# Patient Record
Sex: MALE | Race: WHITE | NOT HISPANIC OR LATINO | Employment: OTHER | URBAN - METROPOLITAN AREA
[De-identification: names, ages, dates, MRNs, and addresses within clinical notes are randomized per-mention and may not be internally consistent; named-entity substitution may affect disease eponyms.]

---
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
## 2018-01-15 NOTE — PROCEDURES
Procedures by Carline Yoder DO  at 6/12/2016 10:07 AM      Author:  Carline Yoder DO Service:  Neurology Author Type:  Physician     Filed:  6/12/2016 10:08 AM Date of Service:  6/12/2016 10:07 AM Status:  Signed     :  Carline Yoder DO (Physician)         Procedures:       1  LUMBAR PUNCTURE [id number] (Custom)]       2  LUMBAR PUNCTURE [id number] (Custom)]       3  LUMBAR PUNCTURE [id number] (Custom)]                   Procedure completed the morning of 6/11/2016    Informed consent obtained  Area sterilized with Iodine sol  1% lidocaine sol administered for local anesthetic  L3-4 interspace identified and entered with 22G 3 5 spinal needle  10-12cc Clear CSF collected  No complications  Patient instructed to lay flat for >1hr               Received for:Provider  EPIC   Jun 12 2016 10:07AM [facility] Standard Time

## 2018-01-15 NOTE — PROCEDURES
Procedures by Thomas Ruby MD at 6/6/2016   9:43 AM      Author:  Thomas Ruby MD Service:  Orthopedics Author Type:  Resident    Filed:  6/6/2016  9:45 AM Date of Service:  6/6/2016  9:43 AM Status:  Attested    :  Thomas Ruby MD (Resident)  Cosigner:  Raphael Monroy MD at 6/6/2016 10:24 AM      Procedures:       1  SPLINT APPLICATION [PHT34 (Custom)]              Attestation signed by Raphael Monroy MD at 6/6/2016 10:24 AM           I agree with the note above  Procedure- Orthopedics   Sabra Mercado 62 y o  male MRN: [id number]  Unit/Bed#: [facility] 923-01    Procedure: right short leg cast    Pt was placed in a well padded short leg cast with 4 inch stockinette, webril, and fiberglass which spanned from the metatarsal heads to just below the tibial tubercle  Pain was controlled with oral/IV pain meds as needed  Pt tolerated the procedure well  and was neurovascularly intact both pre and post procedure  Post casting x rays were obtained and will be reviewed upon completion  Thomas Ruby MD             Received Parul ELIZABETH    Jun 6 2016 10:24AM [facility] Standard Time

## 2023-07-20 ENCOUNTER — APPOINTMENT (EMERGENCY)
Dept: RADIOLOGY | Facility: HOSPITAL | Age: 68
End: 2023-07-20
Payer: COMMERCIAL

## 2023-07-20 ENCOUNTER — APPOINTMENT (EMERGENCY)
Dept: CT IMAGING | Facility: HOSPITAL | Age: 68
End: 2023-07-20
Payer: COMMERCIAL

## 2023-07-20 ENCOUNTER — HOSPITAL ENCOUNTER (OUTPATIENT)
Facility: HOSPITAL | Age: 68
Setting detail: OBSERVATION
Discharge: HOME | End: 2023-07-21
Attending: STUDENT IN AN ORGANIZED HEALTH CARE EDUCATION/TRAINING PROGRAM | Admitting: INTERNAL MEDICINE
Payer: COMMERCIAL

## 2023-07-20 DIAGNOSIS — R55 SYNCOPE: ICD-10-CM
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
## 2023-07-20 DIAGNOSIS — F10.920 ALCOHOLIC INTOXICATION WITHOUT COMPLICATION (HCC): ICD-10-CM

## 2023-07-20 PROBLEM — E87.20 LACTIC ACIDOSIS: Status: ACTIVE | Noted: 2023-07-20

## 2023-07-20 PROBLEM — D53.9 MACROCYTIC ANEMIA: Status: ACTIVE | Noted: 2023-07-20

## 2023-07-20 PROBLEM — S22.39XA RIB FRACTURE: Status: ACTIVE | Noted: 2023-07-20

## 2023-07-20 PROBLEM — F10.929 ALCOHOL INTOXICATION (HCC): Status: ACTIVE | Noted: 2023-07-20

## 2023-07-20 PROBLEM — I10 HYPERTENSION: Status: ACTIVE | Noted: 2023-07-20

## 2023-07-20 PROBLEM — D61.818 PANCYTOPENIA (HCC): Status: ACTIVE | Noted: 2023-07-20

## 2023-07-20 PROBLEM — Z72.0 TOBACCO ABUSE: Status: ACTIVE | Noted: 2023-07-20

## 2023-07-20 LAB
2HR DELTA HS TROPONIN: 1 NG/L
2HR DELTA HS TROPONIN: 1 NG/L
4HR DELTA HS TROPONIN: 2 NG/L
4HR DELTA HS TROPONIN: 2 NG/L
ABO GROUP BLD: NORMAL
ALBUMIN SERPL BCP-MCNC: 3.9 G/DL (ref 3.5–5)
ALBUMIN SERPL BCP-MCNC: 3.9 G/DL (ref 3.5–5)
ALP SERPL-CCNC: 54 U/L (ref 34–104)
ALP SERPL-CCNC: 54 U/L (ref 34–104)
ALT SERPL W P-5'-P-CCNC: 10 U/L (ref 7–52)
ALT SERPL W P-5'-P-CCNC: 10 U/L (ref 7–52)
ANION GAP SERPL CALCULATED.3IONS-SCNC: 10 MMOL/L
ANION GAP SERPL CALCULATED.3IONS-SCNC: 10 MMOL/L
AST SERPL W P-5'-P-CCNC: 16 U/L (ref 13–39)
AST SERPL W P-5'-P-CCNC: 16 U/L (ref 13–39)
BASE EXCESS BLDA CALC-SCNC: -1 MMOL/L (ref -2–3)
BASE EXCESS BLDA CALC-SCNC: -1 MMOL/L (ref -2–3)
BASOPHILS # BLD AUTO: 0 THOUSANDS/ÂΜL (ref 0–0.1)
BASOPHILS # BLD AUTO: 0 THOUSANDS/ÂΜL (ref 0–0.1)
BASOPHILS NFR BLD AUTO: 0 % (ref 0–1)
BASOPHILS NFR BLD AUTO: 0 % (ref 0–1)
BILIRUB SERPL-MCNC: 0.54 MG/DL (ref 0.2–1)
BILIRUB SERPL-MCNC: 0.54 MG/DL (ref 0.2–1)
BLD GP AB SCN SERPL QL: NEGATIVE
BLD GP AB SCN SERPL QL: NEGATIVE
BUN SERPL-MCNC: 13 MG/DL (ref 5–25)
BUN SERPL-MCNC: 13 MG/DL (ref 5–25)
CA-I BLD-SCNC: 1.03 MMOL/L (ref 1.12–1.32)
CA-I BLD-SCNC: 1.03 MMOL/L (ref 1.12–1.32)
CALCIUM SERPL-MCNC: 8.1 MG/DL (ref 8.4–10.2)
CALCIUM SERPL-MCNC: 8.1 MG/DL (ref 8.4–10.2)
CARDIAC TROPONIN I PNL SERPL HS: 3 NG/L
CARDIAC TROPONIN I PNL SERPL HS: 3 NG/L
CARDIAC TROPONIN I PNL SERPL HS: 4 NG/L
CARDIAC TROPONIN I PNL SERPL HS: 4 NG/L
CARDIAC TROPONIN I PNL SERPL HS: 5 NG/L
CARDIAC TROPONIN I PNL SERPL HS: 5 NG/L
CHLORIDE SERPL-SCNC: 107 MMOL/L (ref 96–108)
CHLORIDE SERPL-SCNC: 107 MMOL/L (ref 96–108)
CK SERPL-CCNC: 80 U/L (ref 39–308)
CK SERPL-CCNC: 80 U/L (ref 39–308)
CO2 SERPL-SCNC: 24 MMOL/L (ref 21–32)
CO2 SERPL-SCNC: 24 MMOL/L (ref 21–32)
CREAT SERPL-MCNC: 0.99 MG/DL (ref 0.6–1.3)
CREAT SERPL-MCNC: 0.99 MG/DL (ref 0.6–1.3)
EOSINOPHIL # BLD AUTO: 0.02 THOUSAND/ÂΜL (ref 0–0.61)
EOSINOPHIL # BLD AUTO: 0.02 THOUSAND/ÂΜL (ref 0–0.61)
EOSINOPHIL NFR BLD AUTO: 1 % (ref 0–6)
EOSINOPHIL NFR BLD AUTO: 1 % (ref 0–6)
ERYTHROCYTE [DISTWIDTH] IN BLOOD BY AUTOMATED COUNT: 13.5 % (ref 11.6–15.1)
ERYTHROCYTE [DISTWIDTH] IN BLOOD BY AUTOMATED COUNT: 13.5 % (ref 11.6–15.1)
GFR SERPL CREATININE-BSD FRML MDRD: 79 ML/MIN/1.73SQ M
GFR SERPL CREATININE-BSD FRML MDRD: 79 ML/MIN/1.73SQ M
GLUCOSE SERPL-MCNC: 91 MG/DL (ref 65–140)
GLUCOSE SERPL-MCNC: 91 MG/DL (ref 65–140)
GLUCOSE SERPL-MCNC: 92 MG/DL (ref 65–140)
GLUCOSE SERPL-MCNC: 92 MG/DL (ref 65–140)
HCO3 BLDA-SCNC: 23 MMOL/L (ref 24–30)
HCO3 BLDA-SCNC: 23 MMOL/L (ref 24–30)
HCT VFR BLD AUTO: 34 % (ref 36.5–49.3)
HCT VFR BLD AUTO: 34 % (ref 36.5–49.3)
HCT VFR BLD CALC: 33 % (ref 36.5–49.3)
HCT VFR BLD CALC: 33 % (ref 36.5–49.3)
HGB BLD-MCNC: 11.6 G/DL (ref 12–17)
HGB BLD-MCNC: 11.6 G/DL (ref 12–17)
HGB BLDA-MCNC: 11.2 G/DL (ref 12–17)
HGB BLDA-MCNC: 11.2 G/DL (ref 12–17)
IMM GRANULOCYTES # BLD AUTO: 0.02 THOUSAND/UL (ref 0–0.2)
IMM GRANULOCYTES # BLD AUTO: 0.02 THOUSAND/UL (ref 0–0.2)
IMM GRANULOCYTES NFR BLD AUTO: 1 % (ref 0–2)
IMM GRANULOCYTES NFR BLD AUTO: 1 % (ref 0–2)
LACTATE SERPL-SCNC: 3 MMOL/L (ref 0.5–2)
LACTATE SERPL-SCNC: 3 MMOL/L (ref 0.5–2)
LACTATE SERPL-SCNC: 3.8 MMOL/L (ref 0.5–2)
LACTATE SERPL-SCNC: 3.8 MMOL/L (ref 0.5–2)
LACTATE SERPL-SCNC: 4.2 MMOL/L (ref 0.5–2)
LACTATE SERPL-SCNC: 4.2 MMOL/L (ref 0.5–2)
LYMPHOCYTES # BLD AUTO: 1.37 THOUSANDS/ÂΜL (ref 0.6–4.47)
LYMPHOCYTES # BLD AUTO: 1.37 THOUSANDS/ÂΜL (ref 0.6–4.47)
LYMPHOCYTES NFR BLD AUTO: 41 % (ref 14–44)
LYMPHOCYTES NFR BLD AUTO: 41 % (ref 14–44)
MAGNESIUM SERPL-MCNC: 1.6 MG/DL (ref 1.9–2.7)
MAGNESIUM SERPL-MCNC: 1.6 MG/DL (ref 1.9–2.7)
MCH RBC QN AUTO: 36.7 PG (ref 26.8–34.3)
MCH RBC QN AUTO: 36.7 PG (ref 26.8–34.3)
MCHC RBC AUTO-ENTMCNC: 34.1 G/DL (ref 31.4–37.4)
MCHC RBC AUTO-ENTMCNC: 34.1 G/DL (ref 31.4–37.4)
MCV RBC AUTO: 108 FL (ref 82–98)
MCV RBC AUTO: 108 FL (ref 82–98)
MONOCYTES # BLD AUTO: 0.56 THOUSAND/ÂΜL (ref 0.17–1.22)
MONOCYTES # BLD AUTO: 0.56 THOUSAND/ÂΜL (ref 0.17–1.22)
MONOCYTES NFR BLD AUTO: 17 % (ref 4–12)
MONOCYTES NFR BLD AUTO: 17 % (ref 4–12)
NEUTROPHILS # BLD AUTO: 1.29 THOUSANDS/ÂΜL (ref 1.85–7.62)
NEUTROPHILS # BLD AUTO: 1.29 THOUSANDS/ÂΜL (ref 1.85–7.62)
NEUTS SEG NFR BLD AUTO: 40 % (ref 43–75)
NEUTS SEG NFR BLD AUTO: 40 % (ref 43–75)
NRBC BLD AUTO-RTO: 0 /100 WBCS
NRBC BLD AUTO-RTO: 0 /100 WBCS
PCO2 BLD: 24 MMOL/L (ref 21–32)
PCO2 BLD: 24 MMOL/L (ref 21–32)
PCO2 BLD: 35.1 MM HG (ref 42–50)
PCO2 BLD: 35.1 MM HG (ref 42–50)
PH BLD: 7.42 [PH] (ref 7.3–7.4)
PH BLD: 7.42 [PH] (ref 7.3–7.4)
PLATELET # BLD AUTO: 123 THOUSANDS/UL (ref 149–390)
PLATELET # BLD AUTO: 123 THOUSANDS/UL (ref 149–390)
PMV BLD AUTO: 10.5 FL (ref 8.9–12.7)
PMV BLD AUTO: 10.5 FL (ref 8.9–12.7)
PO2 BLD: 60 MM HG (ref 35–45)
PO2 BLD: 60 MM HG (ref 35–45)
POTASSIUM BLD-SCNC: 3.5 MMOL/L (ref 3.5–5.3)
POTASSIUM BLD-SCNC: 3.5 MMOL/L (ref 3.5–5.3)
POTASSIUM SERPL-SCNC: 3.5 MMOL/L (ref 3.5–5.3)
POTASSIUM SERPL-SCNC: 3.5 MMOL/L (ref 3.5–5.3)
PROT SERPL-MCNC: 5.9 G/DL (ref 6.4–8.4)
PROT SERPL-MCNC: 5.9 G/DL (ref 6.4–8.4)
RBC # BLD AUTO: 3.16 MILLION/UL (ref 3.88–5.62)
RBC # BLD AUTO: 3.16 MILLION/UL (ref 3.88–5.62)
RH BLD: POSITIVE
SAO2 % BLD FROM PO2: 91 % (ref 60–85)
SAO2 % BLD FROM PO2: 91 % (ref 60–85)
SODIUM BLD-SCNC: 141 MMOL/L (ref 136–145)
SODIUM BLD-SCNC: 141 MMOL/L (ref 136–145)
SODIUM SERPL-SCNC: 141 MMOL/L (ref 135–147)
SODIUM SERPL-SCNC: 141 MMOL/L (ref 135–147)
SPECIMEN EXPIRATION DATE: NORMAL
SPECIMEN EXPIRATION DATE: NORMAL
SPECIMEN SOURCE: ABNORMAL
SPECIMEN SOURCE: ABNORMAL
WBC # BLD AUTO: 3.26 THOUSAND/UL (ref 4.31–10.16)
WBC # BLD AUTO: 3.26 THOUSAND/UL (ref 4.31–10.16)

## 2023-07-20 PROCEDURE — 86900 BLOOD TYPING SEROLOGIC ABO: CPT | Performed by: STUDENT IN AN ORGANIZED HEALTH CARE EDUCATION/TRAINING PROGRAM

## 2023-07-20 PROCEDURE — 82550 ASSAY OF CK (CPK): CPT | Performed by: STUDENT IN AN ORGANIZED HEALTH CARE EDUCATION/TRAINING PROGRAM

## 2023-07-20 PROCEDURE — 99223 1ST HOSP IP/OBS HIGH 75: CPT | Performed by: INTERNAL MEDICINE

## 2023-07-20 PROCEDURE — 90715 TDAP VACCINE 7 YRS/> IM: CPT

## 2023-07-20 PROCEDURE — 84484 ASSAY OF TROPONIN QUANT: CPT | Performed by: STUDENT IN AN ORGANIZED HEALTH CARE EDUCATION/TRAINING PROGRAM

## 2023-07-20 PROCEDURE — 86901 BLOOD TYPING SEROLOGIC RH(D): CPT | Performed by: STUDENT IN AN ORGANIZED HEALTH CARE EDUCATION/TRAINING PROGRAM

## 2023-07-20 PROCEDURE — NC001 PR NO CHARGE: Performed by: EMERGENCY MEDICINE

## 2023-07-20 PROCEDURE — 83605 ASSAY OF LACTIC ACID: CPT | Performed by: INTERNAL MEDICINE

## 2023-07-20 PROCEDURE — 72125 CT NECK SPINE W/O DYE: CPT

## 2023-07-20 PROCEDURE — 93005 ELECTROCARDIOGRAM TRACING: CPT

## 2023-07-20 PROCEDURE — 85014 HEMATOCRIT: CPT

## 2023-07-20 PROCEDURE — 99205 OFFICE O/P NEW HI 60 MIN: CPT | Performed by: STUDENT IN AN ORGANIZED HEALTH CARE EDUCATION/TRAINING PROGRAM

## 2023-07-20 PROCEDURE — 84295 ASSAY OF SERUM SODIUM: CPT

## 2023-07-20 PROCEDURE — 36415 COLL VENOUS BLD VENIPUNCTURE: CPT | Performed by: STUDENT IN AN ORGANIZED HEALTH CARE EDUCATION/TRAINING PROGRAM

## 2023-07-20 PROCEDURE — 84132 ASSAY OF SERUM POTASSIUM: CPT

## 2023-07-20 PROCEDURE — 82607 VITAMIN B-12: CPT

## 2023-07-20 PROCEDURE — NC001 PR NO CHARGE: Performed by: STUDENT IN AN ORGANIZED HEALTH CARE EDUCATION/TRAINING PROGRAM

## 2023-07-20 PROCEDURE — 80053 COMPREHEN METABOLIC PANEL: CPT | Performed by: STUDENT IN AN ORGANIZED HEALTH CARE EDUCATION/TRAINING PROGRAM

## 2023-07-20 PROCEDURE — 82330 ASSAY OF CALCIUM: CPT

## 2023-07-20 PROCEDURE — 83735 ASSAY OF MAGNESIUM: CPT

## 2023-07-20 PROCEDURE — 82803 BLOOD GASES ANY COMBINATION: CPT

## 2023-07-20 PROCEDURE — 90471 IMMUNIZATION ADMIN: CPT

## 2023-07-20 PROCEDURE — 85025 COMPLETE CBC W/AUTO DIFF WBC: CPT | Performed by: STUDENT IN AN ORGANIZED HEALTH CARE EDUCATION/TRAINING PROGRAM

## 2023-07-20 PROCEDURE — 82746 ASSAY OF FOLIC ACID SERUM: CPT

## 2023-07-20 PROCEDURE — 71045 X-RAY EXAM CHEST 1 VIEW: CPT

## 2023-07-20 PROCEDURE — 86850 RBC ANTIBODY SCREEN: CPT | Performed by: STUDENT IN AN ORGANIZED HEALTH CARE EDUCATION/TRAINING PROGRAM

## 2023-07-20 PROCEDURE — 70450 CT HEAD/BRAIN W/O DYE: CPT

## 2023-07-20 PROCEDURE — 83605 ASSAY OF LACTIC ACID: CPT | Performed by: STUDENT IN AN ORGANIZED HEALTH CARE EDUCATION/TRAINING PROGRAM

## 2023-07-20 PROCEDURE — 82947 ASSAY GLUCOSE BLOOD QUANT: CPT

## 2023-07-20 RX ORDER — PANTOPRAZOLE SODIUM 40 MG/1
40 TABLET, DELAYED RELEASE ORAL
Status: DISCONTINUED | OUTPATIENT
Start: 2023-07-21 | End: 2023-07-21 | Stop reason: HOSPADM

## 2023-07-20 RX ORDER — LANOLIN ALCOHOL/MO/W.PET/CERES
100 CREAM (GRAM) TOPICAL DAILY
Status: DISCONTINUED | OUTPATIENT
Start: 2023-07-21 | End: 2023-07-21 | Stop reason: HOSPADM

## 2023-07-20 RX ORDER — FOLIC ACID 1 MG/1
1 TABLET ORAL DAILY
Status: DISCONTINUED | OUTPATIENT
Start: 2023-07-21 | End: 2023-07-21 | Stop reason: HOSPADM

## 2023-07-20 RX ORDER — OMEPRAZOLE 20 MG/1
20 CAPSULE, DELAYED RELEASE ORAL DAILY
COMMUNITY

## 2023-07-20 RX ORDER — POTASSIUM CHLORIDE 20 MEQ/1
40 TABLET, EXTENDED RELEASE ORAL ONCE
Status: COMPLETED | OUTPATIENT
Start: 2023-07-20 | End: 2023-07-20

## 2023-07-20 RX ORDER — LISINOPRIL 20 MG/1
20 TABLET ORAL DAILY
COMMUNITY

## 2023-07-20 RX ORDER — ATORVASTATIN CALCIUM 40 MG/1
40 TABLET, FILM COATED ORAL DAILY
COMMUNITY

## 2023-07-20 RX ORDER — ONDANSETRON 2 MG/ML
4 INJECTION INTRAMUSCULAR; INTRAVENOUS ONCE
Status: COMPLETED | OUTPATIENT
Start: 2023-07-20 | End: 2023-07-20

## 2023-07-20 RX ORDER — SODIUM CHLORIDE, SODIUM GLUCONATE, SODIUM ACETATE, POTASSIUM CHLORIDE, MAGNESIUM CHLORIDE, SODIUM PHOSPHATE, DIBASIC, AND POTASSIUM PHOSPHATE .53; .5; .37; .037; .03; .012; .00082 G/100ML; G/100ML; G/100ML; G/100ML; G/100ML; G/100ML; G/100ML
125 INJECTION, SOLUTION INTRAVENOUS CONTINUOUS
Status: DISCONTINUED | OUTPATIENT
Start: 2023-07-20 | End: 2023-07-21 | Stop reason: HOSPADM

## 2023-07-20 RX ORDER — NICOTINE 21 MG/24HR
1 PATCH, TRANSDERMAL 24 HOURS TRANSDERMAL DAILY
Status: DISCONTINUED | OUTPATIENT
Start: 2023-07-21 | End: 2023-07-21 | Stop reason: HOSPADM

## 2023-07-20 RX ORDER — ATORVASTATIN CALCIUM 40 MG/1
40 TABLET, FILM COATED ORAL
Status: DISCONTINUED | OUTPATIENT
Start: 2023-07-20 | End: 2023-07-21 | Stop reason: HOSPADM

## 2023-07-20 RX ORDER — ACETAMINOPHEN 325 MG/1
975 TABLET ORAL EVERY 8 HOURS PRN
Status: DISCONTINUED | OUTPATIENT
Start: 2023-07-20 | End: 2023-07-21 | Stop reason: HOSPADM

## 2023-07-20 RX ADMIN — SODIUM CHLORIDE, SODIUM GLUCONATE, SODIUM ACETATE, POTASSIUM CHLORIDE, MAGNESIUM CHLORIDE, SODIUM PHOSPHATE, DIBASIC, AND POTASSIUM PHOSPHATE 125 ML/HR: .53; .5; .37; .037; .03; .012; .00082 INJECTION, SOLUTION INTRAVENOUS at 19:36

## 2023-07-20 RX ADMIN — ONDANSETRON 4 MG: 2 INJECTION INTRAMUSCULAR; INTRAVENOUS at 19:31

## 2023-07-20 RX ADMIN — POTASSIUM CHLORIDE 40 MEQ: 1500 TABLET, EXTENDED RELEASE ORAL at 19:29

## 2023-07-20 RX ADMIN — SODIUM CHLORIDE 1000 ML: 0.9 INJECTION, SOLUTION INTRAVENOUS at 15:27

## 2023-07-20 RX ADMIN — ATORVASTATIN CALCIUM 40 MG: 40 TABLET, FILM COATED ORAL at 19:29

## 2023-07-20 RX ADMIN — SODIUM CHLORIDE 1000 ML: 0.9 INJECTION, SOLUTION INTRAVENOUS at 21:06

## 2023-07-20 RX ADMIN — TETANUS TOXOID, REDUCED DIPHTHERIA TOXOID AND ACELLULAR PERTUSSIS VACCINE, ADSORBED 0.5 ML: 5; 2.5; 8; 8; 2.5 SUSPENSION INTRAMUSCULAR at 15:34

## 2023-07-20 NOTE — CASE MANAGEMENT
Case Management ED Progress Note    Patient name Yumiko Sen  Location ED-21/ED-21 MRN [id number]  : 1955 Date 2023        OBJECTIVE:  Chief Complaint: Fall  Patient Class: Emergency  Preferred Pharmacy: No Pharmacies Listed  Primary Care Provider: No primary care provider on file. Primary Insurance:   Secondary Insurance:     ED Progress Note:  CM responded to trauma alert. Patient was transported into trauma bay by [facility] for evaluation. Patient responsive to medical team's questions and instructions. CM spoke with patients wife Houston Elaine via patients cell phone, CM notified of patients location and situation. Confirmed cell number [phone] is appropriate for updates. Trauma AP aware of above. No current identified CM needs. CM will follow and update screening, assessment, and discharge planning as appropriate.
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
## 2023-07-20 NOTE — ASSESSMENT & PLAN NOTE
Noted low WBC, platelets, hemoglobin with an   Suspected bone marrow suppression in the setting of alcohol abuse. Denies neuropathy.   Pending vitamin B12 and folate level  Continue with thiamine and folic acid  Sitter starting B12 if levels low  Recommended alcohol cessation

## 2023-07-20 NOTE — ASSESSMENT & PLAN NOTE
Suspected to be in the setting of alcohol abuse.   Pending vitamin Z07 and folic acid  See plan for alcohol intoxication and pancytopenia

## 2023-07-20 NOTE — ASSESSMENT & PLAN NOTE
Reports smoking about 1 pack a day since 13year-old.   Provided nicotine patch  Educated patient about smoking cessation

## 2023-07-20 NOTE — ASSESSMENT & PLAN NOTE
Presents after syncope and fall. Patient had a pint of vodka today. Reports drinking alcohol for the past 3 years, however had a break from drinking strong alcohol (having only beer) since December until yesterday when he restarted drinking vodka. Patient's wife is concerned that the patient keeps refusing inpatient rehab. Patient and the wife interested in getting more information about rehab after discharge.   Case management involved  CIWA protocol  Continue multivitamin, thiamine and folic acid  Continue IV fluids

## 2023-07-20 NOTE — ASSESSMENT & PLAN NOTE
· Noted low WBC, platelets, hemoglobin with an   · Suspected bone marrow suppression in the setting of alcohol abuse. Denies neuropathy. · Vit B12 was normal and folate was decreased at 2.6. Likely due to history of alcohol abuse.    · Continue taking thiamine and folic acid  · Recommended alcohol cessation

## 2023-07-20 NOTE — ASSESSMENT & PLAN NOTE
· Reports smoking about 1 pack a day since 13year-old.   · Provided nicotine patch  · Educated patient about smoking cessation

## 2023-07-20 NOTE — ASSESSMENT & PLAN NOTE
X-ray trauma showing: possible nondisplaced fracture of the lateral left fourth rib  Denies pain of the left chest.  Tylenol as needed

## 2023-07-20 NOTE — ASSESSMENT & PLAN NOTE
· Presents after syncope and fall. Patient had a pint of vodka today. Reports drinking alcohol for the past 3 years, however had a break from drinking strong alcohol (having only beer) since December until yesterday when he restarted drinking vodka. · Patient's wife is concerned that the patient keeps refusing inpatient rehab. Patient and the wife interested in getting more information about rehab after discharge. · Patient also interested in starting medication for alcohol addiction such as ReVIa. Can be initiated upon discharge. We will check urine tox and for opiates since patient cannot be on opioids or addicted to opioids.   · Case management involved  · Continue multivitamin, thiamine and folic acid at home

## 2023-07-20 NOTE — ASSESSMENT & PLAN NOTE
· Patient reports taking lisinopril however does not remember the dosage. Was started on 20 mg for now. .  · He also takes atorvastatin, aspirin, omeprazole and some other medications however he does not remember the dosage  · Will hold lisinopril for now in the setting of soft normal blood pressure  · Can follow home meds with primary care physician outpatient.

## 2023-07-20 NOTE — ASSESSMENT & PLAN NOTE
X-ray trauma showing: possible nondisplaced fracture of the lateral left fourth rib  Denies pain of the left chest.  · Tylenol as needed

## 2023-07-20 NOTE — ASSESSMENT & PLAN NOTE
Presents with lactic acid of 3 the repeat lactic acid went to 4.2  Likely in the setting of alcohol intoxication. Resolved this morning as lactic acid is 1.0.
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
## 2023-07-20 NOTE — ASSESSMENT & PLAN NOTE
Presents with lactic acid of 3 the repeat lactic acid went to 4.2  Likely in the setting of alcohol intoxication.   S/p 1 L after IV fluids  Continue IV hydration with 125 mL/hour  Monitor lactic acid every 2 hours until normalized

## 2023-07-20 NOTE — ASSESSMENT & PLAN NOTE
· Patient reports taking lisinopril however does not remember the dosage. Was started on 20 mg for now.   .  · He also takes atorvastatin, aspirin, omeprazole and some other medications however he does not remember the dosage  · Will hold lisinopril for now in the setting of soft normal blood pressure  Please clarify the home med rec with the patient as he does not remember home med list

## 2023-07-20 NOTE — H&P
H&P - Trauma   Geoff Egan [place] And [place] 81 y.o. male MRN: [id number]  Unit/Bed#:  Encounter: [id number]    Trauma Alert: Level B   Model of Arrival: Ambulance    Trauma Team: Attending Derrick Plunkett, Residents Derrell Heard and  [facility]  Consultants:     None     Assessment/Plan   Active Problems / Assessment:   · Syncope and Collapse  · Scalp abrasion  · CT head- negative   · CT cervical spine- negative      Plan:   Given concerning history of syncope, patient unable to provide further details, does not remember passing out and cannot state if patient had a prodrome, concern for possible cardiac etiology of syncope. Given patient's age, co-morbidities and concerning history, patient will be admitted to medicine for further observation, telemetry monitoring and echo. History of Present Illness     Chief Complaint: Offers no acute concerns   Mechanism:Fall     HPI:    Geoff Bradley And Margarito is a 81 y.o. male with unknown medical history, presenting from a grocery store after suffering a syncopal event. Witnesses state that patient was ambulating in the grocery store when he suddenly collapsed. Positive head strike and loss of consciousness for approximately 2-3 minutes. EMS believes patient smells of alcohol. Takes Asprin daily--no other AC    Review of Systems   Unable to perform ROS: Mental status change     12-point, complete review of systems was reviewed and negative except as stated above. Historical Information     No past medical history on file. No past surgical history on file. Unable to obtain history due to Acute intoxication         There is no immunization history on file for this patient. Last Tetanus: 7/20/23  Family History: Non-contributory      Meds/Allergies   all current active meds have been reviewed  Allergies have not been reviewed;   Not on File    Objective   Initial Vitals:        Primary Survey:   Airway:        Status: patent;        Pre-hospital Interventions: none        Hospital Interventions: none  Breathing:        Pre-hospital Interventions: none       Effort: normal       Right breath sounds: normal       Left breath sounds: normal  Circulation:        Rhythm: regular       Rate: regular   Right Pulses Left Pulses    R radial: 2+  R femoral: 2+  R pedal: 2+     L radial: 2+  L femoral: 2+  L pedal: 2+       Disability:        GCS: Eye: 4; Verbal: 5 Motor: 6 Total: 15       Right Pupil: round;  reactive         Left Pupil:  round;  reactive      R Motor Strength L Motor Strength    R : 5/5  R dorsiflex: 5/5  R plantarflex: 5/5 L : 5/5  L dorsiflex: 5/5  L plantarflex: 5/5        Sensory:  No sensory deficit  Exposure:       Completed: Yes      Secondary Survey:  Physical Exam  Constitutional:       General: He is not in acute distress. Appearance: Normal appearance. He is not ill-appearing, toxic-appearing or diaphoretic. HENT:      Head: Normocephalic. Jaw: There is normal jaw occlusion. Right Ear: External ear normal.      Left Ear: External ear normal.      Nose: Nose normal.      Mouth/Throat:      Mouth: Mucous membranes are moist.      Pharynx: Oropharynx is clear. Eyes:      Extraocular Movements: Extraocular movements intact. Pupils: Pupils are equal, round, and reactive to light. Neck:      Comments: Cervical collar in place  Cardiovascular:      Rate and Rhythm: Normal rate and regular rhythm. Pulses: Normal pulses. Pulmonary:      Effort: No respiratory distress. Breath sounds: Normal breath sounds. No stridor. No wheezing, rhonchi or rales. Chest:      Chest wall: No tenderness. Abdominal:      General: Abdomen is flat. There is no distension. Palpations: Abdomen is soft. Tenderness: There is no abdominal tenderness. There is no guarding. Genitourinary:     Comments: Pelvis stable  Musculoskeletal:         General: No tenderness, deformity or signs of injury.  Normal range of motion. Cervical back: Neck supple. No tenderness. Comments: Patient moving all extremities. No extremity deformities. No hematomas or effusions appreciated. No C, T, L-spine tenderness. No palpable step-offs. Skin:     General: Skin is warm and dry. Capillary Refill: Capillary refill takes less than 2 seconds. Neurological:      General: No focal deficit present. Mental Status: He is alert and oriented to person, place, and time. GCS: GCS eye subscore is 4. GCS verbal subscore is 5. GCS motor subscore is 6. Sensory: Sensation is intact. Motor: Motor function is intact. Comments:  Patient moving all extremities. No lateralizing weakness. Psychiatric:         Mood and Affect: Mood normal.         Behavior: Behavior normal.         Thought Content: Thought content normal.       Cervical Collar Clearance: The patient had a CT scan of the cervical spine demonstrating no acute injury. On exam, the patient had no midline point tenderness or paresthesias/numbness/weakness in the extremities. The patient had full range of motion (was then able to flex, extend, and rotate head laterally) without pain. There were no distracting injuries and the patient was not clinically intoxicated. The patient's cervical spine was cleared radiologically and clinically. Cervical collar removed. Invasive Devices     None               Lab Results: Results: I have personally reviewed all pertinent laboratory/tests results    Imaging Results: I have personally reviewed pertinent reports.     Chest Xray(s): negative for acute findings   FAST exam(s): negative for acute findings   CT Scan(s): negative for acute findings   Additional Xray(s): N/A     Other Studies:     Code Status: No Order  Advance Directive and Living Will:      Power of :    POLST:    I have spent 30 minutes with Patient  today in which greater than 50% of this time was spent in counseling/coordination of care regarding Diagnostic results, Impressions, Counseling / Coordination of care, Documenting in the medical record, Reviewing / ordering tests, medicine, procedures  , Obtaining or reviewing history   and Communicating with other healthcare professionals .

## 2023-07-20 NOTE — PROCEDURES
POC FAST US    Date/Time: 7/20/2023 2:14 PM    Performed by: Jay Jacobs MD  Authorized by: Jay Jacobs MD    Patient location:  Trauma  Procedure details:     Exam Type:  Diagnostic    Indications: blunt abdominal trauma and blunt chest trauma      Assess for:  Hemothorax, intra-abdominal fluid, pneumothorax and pericardial effusion    Technique: extended FAST      Views obtained:  Heart - Pericardial sac, Left thorax, RUQ - Guerrier's Pouch, LUQ - Splenorenal space, Suprapubic - Pouch of Cheng and Right thorax    Image quality: diagnostic      Image availability:  Images available in PACS  FAST Findings:     RUQ (Hepatorenal) free fluid: absent      LUQ (Splenorenal) free fluid: absent      Suprapubic free fluid: absent      Cardiac wall motion: identified      Pericardial effusion: absent    extended FAST (Pulmonary) findings:     Left lung sliding: Present      Right lung sliding: Present      Left pleural effusion: Absent      Right pleural effusion: Absent    Interpretation:     Impressions: negative

## 2023-07-20 NOTE — ASSESSMENT & PLAN NOTE
· Presents on admission due to syncope and collapse initially under trauma team.  Received head trauma after fall, skin abrasion of the scalp, cleared by trauma in terms of acute fractures and transferred to [facility]  · Patient noted being alcohol intoxicated. He states he has not consumed any alcohol in 6 weeks before drinking up to a pint of vodka in the morning before the event. · Not known for past medical history of cardiac pathology  · On admission vital signs stable. · Electrolytes overall normal  · EKG shows sinus bradycardia with heart rate 57, has increased to baseline of 70s-80s. Suspected collapse in the setting of alcohol intoxication versus dehydration versus rule out cardiac origin    Plan:  · Telemetry shows no significant events. · Magnesium was repleted, patient can follow up with PCP in 1 week for further testing.    · Can continue takingTylenol for headache

## 2023-07-20 NOTE — ASSESSMENT & PLAN NOTE
· Presents on admission due to syncope and collapse initially under trauma team.  Received head trauma after fall, skin abrasion of the scalp, cleared by trauma in terms of acute fractures and transferred to [facility]  · Patient noted being alcohol intoxicated. Reports drinking up pint of vodka in the morning before the event. · Not known for past medical history of cardiac pathology  · On admission vital signs stable.   · Electrolytes overall normal  · EKG shows sinus bradycardia with heart rate 57  Suspected collapse in the setting of alcohol intoxication versus dehydration versus rule out cardiac origin    Plan:  Continue telemetry  Continue CIWA protocol  Monitor electrolytes replete as needed  Continue IV fluids 125 mL/hour  Check orthostatic blood pressure  Tylenol for headache

## 2023-07-20 NOTE — ASSESSMENT & PLAN NOTE
Suspected to be in the setting of alcohol abuse.   Vit B12 was normal and Folate was decreased at 2.6  See plan for alcohol intoxication and pancytopenia

## 2023-07-21 VITALS
HEART RATE: 83 BPM | SYSTOLIC BLOOD PRESSURE: 143 MMHG | TEMPERATURE: 98.8 F | OXYGEN SATURATION: 96 % | WEIGHT: 148.59 LBS | DIASTOLIC BLOOD PRESSURE: 77 MMHG | RESPIRATION RATE: 18 BRPM | RESPIRATION RATE: 18 BRPM | WEIGHT: 148.59 LBS | HEART RATE: 83 BPM | OXYGEN SATURATION: 96 % | TEMPERATURE: 98.8 F | SYSTOLIC BLOOD PRESSURE: 143 MMHG | DIASTOLIC BLOOD PRESSURE: 77 MMHG

## 2023-07-21 LAB
ANION GAP SERPL CALCULATED.3IONS-SCNC: 10 MMOL/L
ANION GAP SERPL CALCULATED.3IONS-SCNC: 10 MMOL/L
ATRIAL RATE: 57 BPM
ATRIAL RATE: 57 BPM
BUN SERPL-MCNC: 11 MG/DL (ref 5–25)
BUN SERPL-MCNC: 11 MG/DL (ref 5–25)
CALCIUM SERPL-MCNC: 7.3 MG/DL (ref 8.4–10.2)
CALCIUM SERPL-MCNC: 7.3 MG/DL (ref 8.4–10.2)
CHLORIDE SERPL-SCNC: 113 MMOL/L (ref 96–108)
CHLORIDE SERPL-SCNC: 113 MMOL/L (ref 96–108)
CO2 SERPL-SCNC: 19 MMOL/L (ref 21–32)
CO2 SERPL-SCNC: 19 MMOL/L (ref 21–32)
CREAT SERPL-MCNC: 0.86 MG/DL (ref 0.6–1.3)
CREAT SERPL-MCNC: 0.86 MG/DL (ref 0.6–1.3)
ERYTHROCYTE [DISTWIDTH] IN BLOOD BY AUTOMATED COUNT: 13.5 % (ref 11.6–15.1)
ERYTHROCYTE [DISTWIDTH] IN BLOOD BY AUTOMATED COUNT: 13.5 % (ref 11.6–15.1)
FOLATE SERPL-MCNC: 2.6 NG/ML
FOLATE SERPL-MCNC: 2.6 NG/ML
GFR SERPL CREATININE-BSD FRML MDRD: 90 ML/MIN/1.73SQ M
GFR SERPL CREATININE-BSD FRML MDRD: 90 ML/MIN/1.73SQ M
GLUCOSE P FAST SERPL-MCNC: 67 MG/DL (ref 65–99)
GLUCOSE P FAST SERPL-MCNC: 67 MG/DL (ref 65–99)
GLUCOSE SERPL-MCNC: 67 MG/DL (ref 65–140)
GLUCOSE SERPL-MCNC: 67 MG/DL (ref 65–140)
HCT VFR BLD AUTO: 32.2 % (ref 36.5–49.3)
HCT VFR BLD AUTO: 32.2 % (ref 36.5–49.3)
HGB BLD-MCNC: 10.4 G/DL (ref 12–17)
HGB BLD-MCNC: 10.4 G/DL (ref 12–17)
LACTATE SERPL-SCNC: 1 MMOL/L (ref 0.5–2)
LACTATE SERPL-SCNC: 1 MMOL/L (ref 0.5–2)
LACTATE SERPL-SCNC: 2.6 MMOL/L (ref 0.5–2)
LACTATE SERPL-SCNC: 2.6 MMOL/L (ref 0.5–2)
LACTATE SERPL-SCNC: 2.9 MMOL/L (ref 0.5–2)
LACTATE SERPL-SCNC: 2.9 MMOL/L (ref 0.5–2)
MCH RBC QN AUTO: 36 PG (ref 26.8–34.3)
MCH RBC QN AUTO: 36 PG (ref 26.8–34.3)
MCHC RBC AUTO-ENTMCNC: 32.3 G/DL (ref 31.4–37.4)
MCHC RBC AUTO-ENTMCNC: 32.3 G/DL (ref 31.4–37.4)
MCV RBC AUTO: 111 FL (ref 82–98)
MCV RBC AUTO: 111 FL (ref 82–98)
P AXIS: 55 DEGREES
P AXIS: 55 DEGREES
PLATELET # BLD AUTO: 105 THOUSANDS/UL (ref 149–390)
PLATELET # BLD AUTO: 105 THOUSANDS/UL (ref 149–390)
PMV BLD AUTO: 11.1 FL (ref 8.9–12.7)
PMV BLD AUTO: 11.1 FL (ref 8.9–12.7)
POTASSIUM SERPL-SCNC: 4.3 MMOL/L (ref 3.5–5.3)
POTASSIUM SERPL-SCNC: 4.3 MMOL/L (ref 3.5–5.3)
PR INTERVAL: 140 MS
PR INTERVAL: 140 MS
QRS AXIS: 67 DEGREES
QRS AXIS: 67 DEGREES
QRSD INTERVAL: 96 MS
QRSD INTERVAL: 96 MS
QT INTERVAL: 456 MS
QT INTERVAL: 456 MS
QTC INTERVAL: 443 MS
QTC INTERVAL: 443 MS
RBC # BLD AUTO: 2.89 MILLION/UL (ref 3.88–5.62)
RBC # BLD AUTO: 2.89 MILLION/UL (ref 3.88–5.62)
SODIUM SERPL-SCNC: 142 MMOL/L (ref 135–147)
SODIUM SERPL-SCNC: 142 MMOL/L (ref 135–147)
T WAVE AXIS: 58 DEGREES
T WAVE AXIS: 58 DEGREES
VENTRICULAR RATE: 57 BPM
VENTRICULAR RATE: 57 BPM
VIT B12 SERPL-MCNC: 225 PG/ML (ref 180–914)
VIT B12 SERPL-MCNC: 225 PG/ML (ref 180–914)
WBC # BLD AUTO: 4.49 THOUSAND/UL (ref 4.31–10.16)
WBC # BLD AUTO: 4.49 THOUSAND/UL (ref 4.31–10.16)

## 2023-07-21 PROCEDURE — 85027 COMPLETE CBC AUTOMATED: CPT

## 2023-07-21 PROCEDURE — 83605 ASSAY OF LACTIC ACID: CPT

## 2023-07-21 PROCEDURE — 99239 HOSP IP/OBS DSCHRG MGMT >30: CPT | Performed by: INTERNAL MEDICINE

## 2023-07-21 PROCEDURE — 99214 OFFICE O/P EST MOD 30 MIN: CPT

## 2023-07-21 PROCEDURE — 80048 BASIC METABOLIC PNL TOTAL CA: CPT

## 2023-07-21 PROCEDURE — 80307 DRUG TEST PRSMV CHEM ANLYZR: CPT

## 2023-07-21 RX ORDER — MAGNESIUM SULFATE HEPTAHYDRATE 40 MG/ML
2 INJECTION, SOLUTION INTRAVENOUS ONCE
Status: COMPLETED | OUTPATIENT
Start: 2023-07-21 | End: 2023-07-21

## 2023-07-21 RX ORDER — LANOLIN ALCOHOL/MO/W.PET/CERES
100 CREAM (GRAM) TOPICAL DAILY
Qty: 30 TABLET | Refills: 0 | Status: SHIPPED | OUTPATIENT
Start: 2023-07-22

## 2023-07-21 RX ORDER — FOLIC ACID 1 MG/1
1 TABLET ORAL DAILY
Qty: 30 TABLET | Refills: 0 | Status: SHIPPED | OUTPATIENT
Start: 2023-07-22

## 2023-07-21 RX ADMIN — MULTIPLE VITAMINS W/ MINERALS TAB 1 TABLET: TAB ORAL at 08:17

## 2023-07-21 RX ADMIN — SODIUM CHLORIDE, SODIUM GLUCONATE, SODIUM ACETATE, POTASSIUM CHLORIDE, MAGNESIUM CHLORIDE, SODIUM PHOSPHATE, DIBASIC, AND POTASSIUM PHOSPHATE 125 ML/HR: .53; .5; .37; .037; .03; .012; .00082 INJECTION, SOLUTION INTRAVENOUS at 08:20

## 2023-07-21 RX ADMIN — Medication 100 MG: at 08:17

## 2023-07-21 RX ADMIN — FOLIC ACID 1 MG: 1 TABLET ORAL at 08:17

## 2023-07-21 RX ADMIN — MAGNESIUM SULFATE HEPTAHYDRATE 2 G: 40 INJECTION, SOLUTION INTRAVENOUS at 09:10

## 2023-07-21 RX ADMIN — SODIUM CHLORIDE 500 ML: 0.9 INJECTION, SOLUTION INTRAVENOUS at 04:45

## 2023-07-21 RX ADMIN — PANTOPRAZOLE SODIUM 40 MG: 40 TABLET, DELAYED RELEASE ORAL at 04:50

## 2023-07-21 RX ADMIN — ASPIRIN 81 MG: 81 TABLET, COATED ORAL at 08:17

## 2023-07-21 RX ADMIN — ACETAMINOPHEN 975 MG: 325 TABLET, FILM COATED ORAL at 01:45

## 2023-07-21 RX ADMIN — SODIUM CHLORIDE 500 ML: 0.9 INJECTION, SOLUTION INTRAVENOUS at 01:52

## 2023-07-21 NOTE — PROGRESS NOTES
Progress Note - Trauma Tertiary [place]   Stephan Milton 78 y.o. male [id number]   Unit/Bed#: S -01 Encounter: [id number]     Assessment & Plan   Summary of Diagnosed Injuries:   Syncopal fall  Scalp abrasion    PLAN:  No acute traumatic injuries identified  Syncope workup including orthostatic blood pressures  Local wound care to scalp abrasion  CIWA protocol  Rest of care per primary team.   Trauma will sign off at this time. Please reconsult with any questions or concerns. VTE Prophylaxis:Reason for no pharmacologic prophylaxis deferred to primary team     Disposition: Trauma will sign off at this time. Please reconsult with any questions or concerns. Code status:  Level 1 - Full Code    Consultants: IP CONSULT TO CASE MANAGEMENT     Subjective   Transfer from: N/A    Mechanism of Injury:Fall     Chief Complaint: No complaints    HPI/Last 24 hour events: Patient reports he is doing well this morning and offers no new complaints. He is asking when he can go home. He reports some bleeding from his scalp abrasion that has since stopped. He denies headache, dizziness, chest pain, shortness of breath, abdominal pain, nausea, vomiting, or pain elsewhere. Objective   Vitals:   Temp:  [97.4 °F (36.3 °C)-98.4 °F (36.9 °C)] 98.4 °F (36.9 °C)  HR:  [58-74] 74  Resp:  [16-20] 18  BP: ()/(53-71) 111/61    I/O       07/19 0701  07/20 0700 07/20 0701  07/21 0700    I.V. (mL/kg)  400 (5.9)    Total Intake(mL/kg)  400 (5.9)    Net  +400                 Physical Exam:   GENERAL APPEARANCE: Patient in no acute distress. HEENT: small scalp abrasion without active bleeding; PERRL, EOMs intact; Mucous membranes moist  NECK / BACK: ROM normal  CV: Regular rate and rhythm; no murmur/gallops/rubs appreciated. CHEST / LUNGS: Clear to auscultation; no wheezes/rales/rhonci. ABD: NABS; soft; non-distended; non-tender. : voiding  EXT: +2 pulses bilaterally upper & lower extremities; no edema.   NEURO: GCS 15; no focal neurologic deficits; neurovascularly intact. SKIN: Warm, dry and well perfused; no rash; no jaundice. Invasive Devices     Peripheral Intravenous Line  Duration           Peripheral IV 07/20/23 Left Forearm <1 day                   1. Before the illness or injury that brought you to the Emergency, did you need someone to help you on a regular basis? 0=No   2. Since the illness or injury that brought you to the Emergency, have you needed more help than usual to take care of yourself? 0=No   3. Have you been hospitalized for one or more nights during the past 6 months (excluding a stay in the Emergency Department)? 0=No   4. In general, do you see well? 0=Yes   5. In general, do you have serious problems with your memory? 0=No   6. Do you take more than three different medications everyday? 1=Yes   TOTAL   1     Did you order a geriatric consult if the score was 2 or greater?: n/a         Lab Results:   Results: I have personally reviewed all pertinent laboratory/tests results, BMP/CMP:   Lab Results   Component Value Date    SODIUM 142 07/21/2023    K 4.3 07/21/2023     (H) 07/21/2023    CO2 19 (L) 07/21/2023    CO2 24 07/20/2023    BUN 11 07/21/2023    CREATININE 0.86 07/21/2023    GLUCOSE 92 07/20/2023    CALCIUM 7.3 (L) 07/21/2023    AST 16 07/20/2023    ALT 10 07/20/2023    ALKPHOS 54 07/20/2023    EGFR 90 07/21/2023   , CBC:   Lab Results   Component Value Date    WBC 4.49 07/21/2023    HGB 10.4 (L) 07/21/2023    HCT 32.2 (L) 07/21/2023     (H) 07/21/2023     (L) 07/21/2023    RBC 2.89 (L) 07/21/2023    MCH 36.0 (H) 07/21/2023    MCHC 32.3 07/21/2023    RDW 13.5 07/21/2023    MPV 11.1 07/21/2023    NRBC 0 07/20/2023    and Lactate: No results found for: "LACTATE"    Imaging Results: I have personally reviewed pertinent reports.     Chest Xray(s): negative for acute findings   FAST exam(s): negative for acute findings   CT Scan(s): negative for acute findings   Additional Xray(s): N/A     Other Studies: None

## 2023-07-21 NOTE — CASE MANAGEMENT
Case Management Progress Note    Patient name [facility]  Location S /S -01 MRN [id number]  : 1955 Date 2023       LOS (days): 0  Geometric Mean LOS (GMLOS) (days):   Days to GMLOS:        OBJECTIVE:        Current admission status: Observation  Preferred Pharmacy:   [address] [place] [address] [address][id number] Sally Moots, [address]  [address] [facility] [address]  Phone: [phone] Fax: [phone]    Primary Care Provider: Saint Dutch, CRNP    Primary Insurance: [address] [place] [address] REP  Secondary Insurance:     PROGRESS NOTE:    CM consult received for ETOH/D&A abuse. Spoke with Salome Vazquez at [facility] who reports she will be in to meet with patient and provide D&A resources.

## 2023-07-21 NOTE — PLAN OF CARE
Problem: PAIN - ADULT  Goal: Verbalizes/displays adequate comfort level or baseline comfort level  Description: Interventions:  - Encourage patient to monitor pain and request assistance  - Assess pain using appropriate pain scale  - Administer analgesics based on type and severity of pain and evaluate response  - Implement non-pharmacological measures as appropriate and evaluate response  - Consider cultural and social influences on pain and pain management  - Notify physician/advanced practitioner if interventions unsuccessful or patient reports new pain  Outcome: Progressing     Problem: INFECTION - ADULT  Goal: Absence or prevention of progression during hospitalization  Description: INTERVENTIONS:  - Assess and monitor for signs and symptoms of infection  - Monitor lab/diagnostic results  - Monitor all insertion sites, i.e. indwelling lines, tubes, and drains  - Monitor endotracheal if appropriate and nasal secretions for changes in amount and color  - [place] appropriate cooling/warming therapies per order  - Administer medications as ordered  - Instruct and encourage patient and family to use good hand hygiene technique  - Identify and instruct in appropriate isolation precautions for identified infection/condition  Outcome: Progressing  Goal: Absence of fever/infection during neutropenic period  Description: INTERVENTIONS:  - Monitor WBC    Outcome: Progressing     Problem: SAFETY ADULT  Goal: Patient will remain free of falls  Description: INTERVENTIONS:  - Educate patient/family on patient safety including physical limitations  - Instruct patient to call for assistance with activity   - Consult OT/PT to assist with strengthening/mobility   - Keep Call bell within reach  - Keep bed low and locked with side rails adjusted as appropriate  - Keep care items and personal belongings within reach  - Initiate and maintain comfort rounds  - Make Fall Risk Sign visible to staff  - Offer Toileting every 2 Hours, in advance of need  - Initiate/Maintain bed/ chair alarm  - Obtain necessary fall risk management equipment  - Apply yellow socks and bracelet for high fall risk patients  - Consider moving patient to room near nurses station  Outcome: Progressing  Goal: Maintain or return to baseline ADL function  Description: INTERVENTIONS:  -  Assess patient's ability to carry out ADLs; assess patient's baseline for ADL function and identify physical deficits which impact ability to perform ADLs (bathing, care of mouth/teeth, toileting, grooming, dressing, etc.)  - Assess/evaluate cause of self-care deficits   - Assess range of motion  - Assess patient's mobility; develop plan if impaired  - Assess patient's need for assistive devices and provide as appropriate  - Encourage maximum independence but intervene and supervise when necessary  - Involve family in performance of ADLs  - Assess for home care needs following discharge   - Consider OT consult to assist with ADL evaluation and planning for discharge  - Provide patient education as appropriate  Outcome: Progressing  Goal: Maintains/Returns to pre admission functional level  Description: INTERVENTIONS:  - Perform BMAT or MOVE assessment daily.   - Set and communicate daily mobility goal to care team and patient/family/caregiver.    - Collaborate with rehabilitation services on mobility goals if consulted  - Perform Range of Motion   - Reposition patient   - Dangle patient   - Stand patient  - Ambulate patient   - Out of bed to chair   - Out of bed for meals  - Out of bed for toileting  - Record patient progress and toleration of activity level   Outcome: Progressing     Problem: DISCHARGE PLANNING  Goal: Discharge to home or other facility with appropriate resources  Description: INTERVENTIONS:  - Identify barriers to discharge w/patient and caregiver  - Arrange for needed discharge resources and transportation as appropriate  - Identify discharge learning needs (meds, wound care, etc.)  - Arrange for interpretive services to assist at discharge as needed  - Refer to Case Management Department for coordinating discharge planning if the patient needs post-hospital services based on physician/advanced practitioner order or complex needs related to functional status, cognitive ability, or social support system  Outcome: Progressing     Problem: Nutrition/Hydration-ADULT  Goal: Nutrient/Hydration intake appropriate for improving, restoring or maintaining nutritional needs  Description: Monitor and assess patient's nutrition/hydration status for malnutrition. Collaborate with interdisciplinary team and initiate plan and interventions as ordered. Monitor patient's weight and dietary intake as ordered or per policy. Utilize nutrition screening tool and intervene as necessary. Determine patient's food preferences and provide high-protein, high-caloric foods as appropriate.      INTERVENTIONS:  - Monitor oral intake, urinary output, labs, and treatment plans  - Assess nutrition and hydration status and recommend course of action  - Evaluate amount of meals eaten  - Assist patient with eating if necessary   - Allow adequate time for meals  - Recommend/ encourage appropriate diets, oral nutritional supplements, and vitamin/mineral supplements  - Order, calculate, and assess calorie counts as needed  - Recommend, monitor, and adjust tube feedings and TPN/PPN based on assessed needs  - Assess need for intravenous fluids  - Provide specific nutrition/hydration education as appropriate  - Include patient/family/caregiver in decisions related to nutrition  Outcome: Progressing

## 2023-07-21 NOTE — DISCHARGE INSTR - AVS FIRST PAGE
Dear Bryan Lee,     It was our pleasure to care for you here at [facility]. It is our hope that we were always able to exceed the expected standards for your care during your stay. You were hospitalized due to fall and fainting. You were cared for on the third floor by Socorro Soto DO under the service of Kamlesh Quintanilla DO with the [facility]ist [facility] who covers for your primary care physician (PCP), Orville Estevez, while you were hospitalized. If you have any questions or concerns related to this hospitalization, you may contact us at [phone]. For follow up as well as any medication refills, we recommend that you follow up with your primary care physician. A registered nurse will reach out to you by phone within a few days after your discharge to answer any additional questions that you may have after going home. However, at this time we provide for you here, the most important instructions / recommendations at discharge:     Notable Medication Adjustments -   None  Testing Required after Discharge -   Please complete testing as per your primary care physician. Important follow up information -   Please discontinue alcohol use. Please talk to your primary care about possible medications to help with decreasing alcohol usage. Other Instructions -   Please come to the emergency department if you have any headaches, dizziness, shortness of breath, or any other significant symptoms. Please review this entire after visit summary as additional general instructions including medication list, appointments, activity, diet, any pertinent wound care, and other additional recommendations from your care team that may be provided for you.       Sincerely,     Socorro Soto DO

## 2023-07-21 NOTE — UTILIZATION REVIEW
Initial Clinical Review    Admission: Date/Time/Statement:  7/20/23 1544 Observation   Admission Orders (From admission, onward)     Ordered        07/20/23 1544  Place in Observation  Once                      Orders Placed This Encounter   Procedures   • Place in Observation     Standing Status:   Standing     Number of Occurrences:   1     Order Specific Question:   Level of Care     Answer:   Med Surg [16]     ED Arrival Information     Expected   -    Arrival   7/20/2023 14:16    Acuity   Emergent            Means of arrival   Ambulance    Escorted by   [facility]    Admission type   Emergency            Arrival complaint   -           Chief Complaint   Patient presents with   • Syncope     Pt at [facility] when he had a witnessed syncopal episode. +ETOH +ASA        Initial Presentation: 78 y.o. male from home to ED via ems admitted to observation due to syncope/lactic acidosis/Rib fracture/alcohol intoxication. Presented due to syncopal event while at grocery store just prior to arrival, per bystanders, + head strike and loss of consciousness about 2 to 3 minutes. On asa. Per ems smelled of alcohol. Patient drank pint vodka in the morning. On exam: cervical collar in place. Alert and oriented. No lateralizing weakness. Wbc 3.26.  H&H 11.6/34. Platelets 679. Lactic acid 3. CxR showed possible fracture of lateral left 4th rib. In the ED given 1 liter IVF, tetanus. Plan is telemetry. CIWA. Trend BMP and replete electrolytes as needed. Continue IVF. Check orthostatics. Pain control. Trend BP and hold home lisinopril.     7/21/23 Observation:   has some abrasion from scalp abrasion.     On exam:  small scalp abrasion without active bleeding      ED Triage Vitals   Temperature Pulse Respirations Blood Pressure SpO2   07/20/23 1419 07/20/23 1419 07/20/23 1419 07/20/23 1419 07/20/23 1419   (!) 97.4 °F (36.3 °C) 68 20 114/71 97 %      Temp Source Heart Rate Source Patient Position - Orthostatic VS BP Location FiO2 (%)   07/20/23 1709 07/20/23 1445 07/20/23 1445 07/20/23 1709 --   Oral Monitor Lying Right arm       Pain Score       07/20/23 1633       No Pain          Wt Readings from Last 1 Encounters:   07/20/23 67.4 kg (148 lb 9.4 oz)     Additional Vital Signs:   07/21/23 0706 98.6 °F (37 °C) 85 -- 122/65 88 96 % None (Room air) Lying   07/21/23 0600 -- 85 -- 122/65 -- -- -- --   07/21/23 0200 -- 78 -- 125/63 -- -- -- --   07/21/23 01:58:52 -- -- -- 125/63 84 -- -- --   07/20/23 21:24:34 98.4 °F (36.9 °C) -- 18 111/61 78 -- -- --   07/20/23 17:09:05 98.1 °F (36.7 °C) 74 18 105/67 80 92 % -- Lying   07/20/23 1600 -- 67 16 -- -- 92 % None (Room air) --   07/20/23 1500 -- 59  16 103/59 76 92 %  None (Room air) Lying   07/20/23 1445 -- 59 16 96/53 -- 91 % None (Room air)      CIWA  7/21/23:  6 at 0600.   5 at 0200. Pertinent Labs/Diagnostic Test Results:   TRAUMA - CT head wo contrast   Final Result by Keyur Stevens MD (07/20 1458)      No acute intracranial abnormality. I personally discussed this study with Igor on 7/20/2023 2:46 PM.                        Workstation performed: [id number]         TRAUMA - CT spine cervical wo contrast   Final Result by Keyur Stevens MD (07/20 4987)      No cervical spine fracture or traumatic malalignment. I personally discussed this study with Igor on 7/20/2023 2:47 PM.                     Workstation performed: [id number]         XR Trauma multiple (SLB/SLRA trauma bay ONLY)   Final Result by Pat Joyce MD (07/20 1513)      No acute cardiopulmonary disease within limitations of supine imaging. Possible nondisplaced fracture of the lateral left fourth rib. The study was marked in [facility] for immediate notification.          Workstation performed: [id number]         XR chest 1 view    (Results Pending)      7/20/23 ecg Sinus bradycardia   Otherwise normal ECG   No previous ECGs available     Results from last 7 days   Lab Units 07/21/23  0500 07/20/23  1427 07/20/23  1425   WBC Thousand/uL 4.49  --  3.26*   HEMOGLOBIN g/dL 10.4*  --  11.6*   I STAT HEMOGLOBIN g/dl  --  11.2*  --    HEMATOCRIT % 32.2*  --  34.0*   HEMATOCRIT, ISTAT %  --  33*  --    PLATELETS Thousands/uL 105*  --  123*   NEUTROS ABS Thousands/µL  --   --  1.29*     Results from last 7 days   Lab Units 07/21/23  0500 07/20/23  1427 07/20/23  1425   SODIUM mmol/L 142  --  141   POTASSIUM mmol/L 4.3  --  3.5   CHLORIDE mmol/L 113*  --  107   CO2 mmol/L 19*  --  24   CO2, I-STAT mmol/L  --  24  --    ANION GAP mmol/L 10  --  10   BUN mg/dL 11  --  13   CREATININE mg/dL 0.86  --  0.99   EGFR ml/min/1.73sq m 90  --  79   CALCIUM mg/dL 7.3*  --  8.1*   CALCIUM, IONIZED, ISTAT mmol/L  --  1.03*  --    MAGNESIUM mg/dL  --   --  1.6*     Results from last 7 days   Lab Units 07/20/23  1425   AST U/L 16   ALT U/L 10   ALK PHOS U/L 54   TOTAL PROTEIN g/dL 5.9*   ALBUMIN g/dL 3.9   TOTAL BILIRUBIN mg/dL 0.54     Results from last 7 days   Lab Units 07/21/23  0500 07/20/23  1425   GLUCOSE RANDOM mg/dL 67 91     Results from last 7 days   Lab Units 07/20/23  1427   PH, CAREN I-STAT  7.424*   PCO2, CAREN ISTAT mm HG 35.1*   PO2, CRAEN ISTAT mm HG 60.0*   HCO3, CAREN ISTAT mmol/L 23.0*   I STAT BASE EXC mmol/L -1   I STAT O2 SAT % 91*     Results from last 7 days   Lab Units 07/20/23  1425   CK TOTAL U/L 80     Results from last 7 days   Lab Units 07/20/23  1851 07/20/23  1605 07/20/23  1425   HS TNI 0HR ng/L  --   --  3   HS TNI 2HR ng/L  --  4  --    HSTNI D2 ng/L  --  1  --    HS TNI 4HR ng/L 5  --   --    HSTNI D4 ng/L 2  --   --      Results from last 7 days   Lab Units 07/21/23  0500 07/21/23  0123 07/20/23  2327 07/20/23  2057 07/20/23  1853 07/20/23  1425   LACTIC ACID mmol/L 1.0 2.6* 2.9* 3.8* 4.2* 3.0*     ED Treatment:   Medication Administration from 07/20/2023 1409 to 07/20/2023 1632       Date/Time Order Dose Route Action Comments     07/20/2023 1534 EDT tetanus-diphtheria-acellular pertussis (BOOSTRIX) IM injection 0.5 mL 0.5 mL Intramuscular Given --     07/20/2023 1527 EDT sodium chloride 0.9 % bolus 1,000 mL 1,000 mL Intravenous New Bag --        History reviewed. No pertinent past medical history. Present on Admission:  **None**      Admitting Diagnosis: Syncope [R55]  Age/Sex: 78 y.o. male  Admission Orders:  Scheduled Medications:  aspirin, 81 mg, Oral, Daily  atorvastatin, 40 mg, Oral, Daily With Dinner  folic acid, 1 mg, Oral, Daily  magnesium sulfate, 2 g, Intravenous, Once  multivitamin-minerals, 1 tablet, Oral, Daily  nicotine, 1 patch, Transdermal, Daily  pantoprazole, 40 mg, Oral, Early Morning  thiamine, 100 mg, Oral, Daily    magnesium sulfate 2 g/50 mL IVPB (premix) 2 g  Dose: 2 g  Freq: Once Route: IV  Last Dose: 2 g (07/21/23 0910)  Start: 07/21/23 0830    sodium chloride 0.9 % bolus 500 mL  Dose: 500 mL  Freq: Once Route: IV  Last Dose: 500 mL (07/21/23 0445)  Start: 07/21/23 0400 End: 07/21/23 0545    sodium chloride 0.9 % bolus 500 mL  Dose: 500 mL  Freq: Once Route: IV  Indications of Use: FLUID AND ELECTROLYTE DISTURBANCE  Last Dose: 500 mL (07/21/23 0152)  Start: 07/21/23 0030 End: 07/21/23 0252      Continuous IV Infusions:  multi-electrolyte, 125 mL/hr, Intravenous, Continuous    PRN Meds:  acetaminophen, 975 mg, Oral, Q8H PRN x 1 7/21    telemetry   CIWA  Continuous pulse ox    Network Utilization Review Department  ATTENTION: Please call with any questions or concerns to [phone] and carefully listen to the prompts so that you are directed to the right person. All voicemails are confidential.  Maryan Gitelman all requests for admission clinical reviews, approved or denied determinations and any other requests to dedicated fax number below belonging to the campus where the patient is receiving treatment.  List of dedicated fax numbers for the Facilities:  FACILITY NAME UR FAX NUMBER   ADMISSION DENIALS (Administrative/Medical Necessity) [phone] 230[address] [facility] (Maternity/NICU/Pediatrics) [address] [facility] [phone] [address] [facility] [address] [address] [facility] [address] [phone]

## 2023-07-21 NOTE — INCIDENTAL FINDINGS
The following findings require follow up:  Radiographic finding   Finding: Possible nondisplaced fracture of the lateral left fourth rib. Follow up required: See PCP if pain with breathing.     Follow up should be done as needed or 4 week(s)    Please notify the following clinician to assist with the follow up:  Yasmeen Nice

## 2023-07-21 NOTE — DISCHARGE SUMMARY
[address]  Discharge- Dg Camargo 1955, 78 y.o. male MRN: [id number]  Unit/Bed#: S -Sarahy Encounter: [id number]  Primary Care Provider: KOFI Domingo   Date and time admitted to hospital: 7/20/2023  2:16 PM    * Syncope and collapse  Assessment & Plan  · Presents on admission due to syncope and collapse initially under trauma team.  Received head trauma after fall, skin abrasion of the scalp, cleared by trauma in terms of acute fractures and transferred to [facility]  · Patient noted being alcohol intoxicated. He states he has not consumed any alcohol in 6 weeks before drinking up to a pint of vodka in the morning before the event. · Not known for past medical history of cardiac pathology  · On admission vital signs stable. · Electrolytes overall normal  · EKG shows sinus bradycardia with heart rate 57, has increased to baseline of 70s-80s. Suspected collapse in the setting of alcohol intoxication versus dehydration versus rule out cardiac origin    Plan:  · Telemetry shows no significant events. · Magnesium was repleted, patient can follow up with PCP in 1 week for further testing. · Can continue takingTylenol for headache    Alcohol intoxication ([address])  Assessment & Plan  · Presents after syncope and fall. Patient had a pint of vodka today. Reports drinking alcohol for the past 3 years, however had a break from drinking strong alcohol (having only beer) since December until yesterday when he restarted drinking vodka. · Patient's wife is concerned that the patient keeps refusing inpatient rehab. Patient and the wife interested in getting more information about rehab after discharge. · Patient also interested in starting medication for alcohol addiction such as ReVIa. Can be initiated upon discharge. We will check urine tox and for opiates since patient cannot be on opioids or addicted to opioids.   · Case management involved  · Continue multivitamin, thiamine and folic acid at home    Pancytopenia [facility])  Assessment & Plan  · Noted low WBC, platelets, hemoglobin with an   · Suspected bone marrow suppression in the setting of alcohol abuse. Denies neuropathy. · Vit B12 was normal and folate was decreased at 2.6. Likely due to history of alcohol abuse. · Continue taking thiamine and folic acid  · Recommended alcohol cessation    Macrocytic anemia  Assessment & Plan  Suspected to be in the setting of alcohol abuse. Vit B12 was normal and Folate was decreased at 2.6  See plan for alcohol intoxication and pancytopenia    Hypertension  Assessment & Plan  · Patient reports taking lisinopril however does not remember the dosage. Was started on 20 mg for now. .  · He also takes atorvastatin, aspirin, omeprazole and some other medications however he does not remember the dosage  · Will hold lisinopril for now in the setting of soft normal blood pressure  · Can follow home meds with primary care physician outpatient. Lactic acidosis  Assessment & Plan  Presents with lactic acid of 3 the repeat lactic acid went to 4.2  Likely in the setting of alcohol intoxication. Resolved this morning as lactic acid is 1.0. Rib fracture  Assessment & Plan  X-ray trauma showing: possible nondisplaced fracture of the lateral left fourth rib  Denies pain of the left chest.  · Tylenol as needed    Tobacco abuse  Assessment & Plan  · Reports smoking about 1 pack a day since 13year-old.   · Provided nicotine patch  · Educated patient about smoking cessation      Medical Problems     Resolved Problems  Date Reviewed: 7/21/2023   None       Discharging Resident: Callum Hall DO  Discharging Attending: Amy Magaña DO  PCP: KOFI Olivares  Admission Date:   Admission Orders (From admission, onward)     Ordered        07/20/23 1544  Place in Observation  Once                      Discharge Date: 07/21/23    [address] [facility] Stay:  · Trauma    Procedures Performed:   · None    Significant Findings / Test Results:   · Garcia catheter was decreased at 2.6    Incidental Findings:        · Possible nondisplaced fracture of the lateral left fourth rib. · I reviewed the above mentioned incidental findings with the patient and/or family and they expressed understanding. Test Results Pending at Discharge (will require follow up): · None     Outpatient Tests Requested:  · As per primary care physician    Complications: None    Reason for Admission: Syncope and fall    Hospital Course:   Jas Gonzalez is a 78 y.o. male patient who originally presented to the hospital on 7/20/2023 due to loss of consciousness and fall. He does not remember when he fell outside the store, but remembers waking up in the ED. He reported that he drank a pint of vodka the morning before going to the store. He was admitted initially under trauma service as he is on aspirin at home. X-ray showed a possible left rib fracture without intracranial bleeding or acute fractures noted on CT or x-ray in the ED. He was admitted to our service for observation and work-up for his syncope. In observation we completed multiple lab tests which were significant for decreased folic acid at 2.6 as well as lactic acidosis which resolved. Patient also had pancytopenia which most likely is secondary to his alcohol abuse. His magnesium was decreased which was repleted. Today he reported improvement of his symptoms and is stable. He is interested in using medication to decrease his alcohol reliance. He will be given this information on discharge. As he is stable today, he will be discharged. The patient, initially admitted to the hospital as inpatient, was discharged earlier than expected given the following: Improvement in symptoms patient is stable. Please see above list of diagnoses and related plan for additional information.      Condition at Discharge: stable    Discharge Day Visit / Exam:   Subjective: Patient seen at bedside today. No acute events overnight. He states he is feeling much better. He states that he does have a history of alcohol use but states that he stopped drinking 1.5 months ago. He states that after 6 weeks, yesterday was the first time he had any alcohol. He drank a pint of vodka and left to the store to get some groceries. On the way out he fell and did not remember his fall and woke up in the emergency department. He states he has had these syncopal episodes in the past when he has been drinking too much. Overall he was stable and denied any significant symptoms including headache, chest pain, shortness of breath, abdominal pain, nausea, vomiting, diarrhea, constipation, urinary symptoms. Vitals: Blood Pressure: 143/77 (07/21/23 1131)  Pulse: 83 (07/21/23 1000)  Temperature: 98.8 °F (37.1 °C) (07/21/23 1131)  Temp Source: Oral (07/21/23 0706)  Respirations: 18 (07/21/23 1131)  Weight - Scale: 67.4 kg (148 lb 9.4 oz) (07/20/23 1426)  SpO2: 96 % (07/21/23 0706)  Exam:   Physical Exam  Vitals and nursing note reviewed. Constitutional:       General: He is not in acute distress. Appearance: Normal appearance. He is not ill-appearing, toxic-appearing or diaphoretic. HENT:      Head: Normocephalic. Comments: Ecchymosis noted on posterior scalp  Eyes:      Extraocular Movements: Extraocular movements intact. Pupils: Pupils are equal, round, and reactive to light. Cardiovascular:      Rate and Rhythm: Normal rate. Pulses: Normal pulses. Heart sounds: No murmur heard. No friction rub. No gallop. Pulmonary:      Effort: Pulmonary effort is normal. No respiratory distress. Breath sounds: No wheezing, rhonchi or rales. Abdominal:      General: There is no distension. Tenderness: There is no abdominal tenderness. There is no guarding or rebound.    Musculoskeletal:         General: Normal range of motion. Cervical back: Normal range of motion. No rigidity. Right lower leg: No edema. Left lower leg: No edema. Skin:     General: Skin is warm. Coloration: Jaundice: on posterior scalp s/p fall. Findings: Bruising present. Neurological:      General: No focal deficit present. Mental Status: He is alert and oriented to person, place, and time. Psychiatric:         Mood and Affect: Mood normal.         Behavior: Behavior normal.         Thought Content: Thought content normal.         Judgment: Judgment normal.          Discussion with Family: Patient declined call to . Discharge instructions/Information to patient and family:   See after visit summary for information provided to patient and family. Provisions for Follow-Up Care:  See after visit summary for information related to follow-up care and any pertinent home health orders. Disposition:   Home    Planned Readmission: None    Discharge Medications:  See after visit summary for reconciled discharge medications provided to patient and/or family.       **Please Note: This note may have been constructed using a voice recognition system**

## 2023-07-22 LAB
AMPHETAMINES UR QL SCN: NEGATIVE NG/ML
AMPHETAMINES UR QL SCN: NEGATIVE NG/ML
BARBITURATES UR QL SCN: NEGATIVE NG/ML
BARBITURATES UR QL SCN: NEGATIVE NG/ML
BENZODIAZ UR QL: NEGATIVE NG/ML
BENZODIAZ UR QL: NEGATIVE NG/ML
BZE UR QL: NEGATIVE NG/ML
BZE UR QL: NEGATIVE NG/ML
CANNABINOIDS UR QL SCN: NEGATIVE NG/ML
CANNABINOIDS UR QL SCN: NEGATIVE NG/ML
METHADONE UR QL SCN: NEGATIVE NG/ML
METHADONE UR QL SCN: NEGATIVE NG/ML
OPIATES UR QL: NEGATIVE NG/ML
OPIATES UR QL: NEGATIVE NG/ML
PCP UR QL: NEGATIVE NG/ML
PCP UR QL: NEGATIVE NG/ML
PROPOXYPH UR QL SCN: NEGATIVE NG/ML
PROPOXYPH UR QL SCN: NEGATIVE NG/ML